# Patient Record
Sex: FEMALE | Race: WHITE | NOT HISPANIC OR LATINO | Employment: OTHER | URBAN - METROPOLITAN AREA
[De-identification: names, ages, dates, MRNs, and addresses within clinical notes are randomized per-mention and may not be internally consistent; named-entity substitution may affect disease eponyms.]

---

## 2018-09-21 PROBLEM — E78.5 HYPERLIPIDEMIA: Status: ACTIVE | Noted: 2018-09-21

## 2018-09-21 PROBLEM — R20.0 LEFT ARM NUMBNESS: Status: ACTIVE | Noted: 2018-09-21

## 2018-09-21 PROBLEM — G45.9 TRANSIENT CEREBRAL ISCHEMIA: Status: ACTIVE | Noted: 2018-09-21

## 2018-09-21 PROBLEM — I16.0 HYPERTENSIVE URGENCY: Status: ACTIVE | Noted: 2018-09-21

## 2018-09-21 PROBLEM — G44.89 OTHER HEADACHE SYNDROME: Status: ACTIVE | Noted: 2018-09-21

## 2018-11-20 PROBLEM — G44.89 OTHER HEADACHE SYNDROME: Status: RESOLVED | Noted: 2018-09-21 | Resolved: 2018-11-20

## 2018-11-20 PROBLEM — D75.839 THROMBOCYTOSIS: Status: ACTIVE | Noted: 2018-11-20

## 2018-11-20 PROBLEM — R20.0 LEFT ARM NUMBNESS: Status: RESOLVED | Noted: 2018-09-21 | Resolved: 2018-11-20

## 2018-11-20 PROBLEM — Z86.73 HISTORY OF TRANSIENT ISCHEMIC ATTACK (TIA): Status: ACTIVE | Noted: 2018-09-21

## 2018-11-20 PROBLEM — D75.1 POLYCYTHEMIA: Status: ACTIVE | Noted: 2018-11-20

## 2018-11-20 PROBLEM — Z86.39 HISTORY OF PARATHYROID DISEASE: Status: ACTIVE | Noted: 2018-11-20

## 2018-11-20 PROBLEM — I10 ESSENTIAL HYPERTENSION: Status: ACTIVE | Noted: 2018-09-21

## 2020-09-21 PROBLEM — C18.8 OVERLAPPING MALIGNANT NEOPLASM OF COLON: Status: ACTIVE | Noted: 2020-09-21

## 2020-09-21 PROBLEM — D47.1 MYELOPROLIFERATIVE DISORDER: Status: ACTIVE | Noted: 2020-09-21

## 2021-01-06 ENCOUNTER — IMMUNIZATION (OUTPATIENT)
Dept: FAMILY MEDICINE | Facility: CLINIC | Age: 76
End: 2021-01-06
Payer: MEDICARE

## 2021-01-06 DIAGNOSIS — Z23 NEED FOR VACCINATION: ICD-10-CM

## 2021-01-06 PROCEDURE — 91300 COVID-19, MRNA, LNP-S, PF, 30 MCG/0.3 ML DOSE VACCINE: CPT | Mod: PBBFAC,PO

## 2021-01-27 ENCOUNTER — IMMUNIZATION (OUTPATIENT)
Dept: FAMILY MEDICINE | Facility: CLINIC | Age: 76
End: 2021-01-27
Payer: MEDICARE

## 2021-01-27 DIAGNOSIS — Z23 NEED FOR VACCINATION: Primary | ICD-10-CM

## 2021-01-27 PROCEDURE — 91300 COVID-19, MRNA, LNP-S, PF, 30 MCG/0.3 ML DOSE VACCINE: CPT | Mod: PBBFAC,PO

## 2021-01-27 PROCEDURE — 0002A COVID-19, MRNA, LNP-S, PF, 30 MCG/0.3 ML DOSE VACCINE: CPT | Mod: PBBFAC,PO

## 2021-09-05 PROBLEM — K92.1 MELENA: Status: ACTIVE | Noted: 2021-09-05

## 2021-09-05 PROBLEM — D72.825 BANDEMIA: Status: ACTIVE | Noted: 2021-09-05

## 2021-09-05 PROBLEM — Z71.89 ACP (ADVANCE CARE PLANNING): Status: ACTIVE | Noted: 2021-09-05

## 2021-09-05 PROBLEM — I25.10 CORONARY ARTERY DISEASE INVOLVING NATIVE CORONARY ARTERY OF NATIVE HEART WITHOUT ANGINA PECTORIS: Status: ACTIVE | Noted: 2021-09-05

## 2021-09-06 PROBLEM — D62 ACUTE BLOOD LOSS ANEMIA: Status: ACTIVE | Noted: 2021-09-06

## 2021-09-06 PROBLEM — D64.9 SYMPTOMATIC ANEMIA: Status: ACTIVE | Noted: 2021-09-06

## 2021-09-08 PROBLEM — K22.6 MALLORY-WEISS TEAR: Status: ACTIVE | Noted: 2021-09-05

## 2021-09-09 ENCOUNTER — PATIENT MESSAGE (OUTPATIENT)
Dept: GASTROENTEROLOGY | Facility: CLINIC | Age: 76
End: 2021-09-09

## 2021-09-27 ENCOUNTER — IMMUNIZATION (OUTPATIENT)
Dept: FAMILY MEDICINE | Facility: CLINIC | Age: 76
End: 2021-09-27
Payer: MEDICARE

## 2021-09-27 DIAGNOSIS — Z23 NEED FOR VACCINATION: Primary | ICD-10-CM

## 2021-09-27 PROCEDURE — 0003A COVID-19, MRNA, LNP-S, PF, 30 MCG/0.3 ML DOSE VACCINE: CPT | Mod: PBBFAC,PO

## 2021-09-27 PROCEDURE — 91300 COVID-19, MRNA, LNP-S, PF, 30 MCG/0.3 ML DOSE VACCINE: CPT | Mod: PBBFAC,PO

## 2022-04-22 ENCOUNTER — IMMUNIZATION (OUTPATIENT)
Dept: FAMILY MEDICINE | Facility: CLINIC | Age: 77
End: 2022-04-22
Payer: MEDICARE

## 2022-04-22 DIAGNOSIS — Z23 NEED FOR VACCINATION: Primary | ICD-10-CM

## 2022-04-22 PROCEDURE — 91305 COVID-19, MRNA, LNP-S, PF, 30 MCG/0.3 ML DOSE VACCINE (PFIZER): CPT | Mod: PBBFAC,PO

## 2023-01-16 PROBLEM — I70.0 AORTIC ATHEROSCLEROSIS: Status: ACTIVE | Noted: 2023-01-16

## 2023-01-16 PROBLEM — I20.9 ANGINA PECTORIS, UNSPECIFIED: Status: ACTIVE | Noted: 2023-01-16

## 2023-06-08 ENCOUNTER — TELEPHONE (OUTPATIENT)
Dept: FAMILY MEDICINE | Facility: CLINIC | Age: 78
End: 2023-06-08
Payer: MEDICARE

## 2023-06-08 NOTE — TELEPHONE ENCOUNTER
Called patient regarding back and spine referral and patient said condition improved so she doesn't need appointment. AMADA

## 2023-07-17 PROBLEM — Z71.89 ACP (ADVANCE CARE PLANNING): Status: RESOLVED | Noted: 2021-09-05 | Resolved: 2023-07-17

## 2024-03-14 PROBLEM — Z95.5 S/P RIGHT CORONARY ARTERY (RCA) STENT PLACEMENT: Status: ACTIVE | Noted: 2024-03-14

## 2024-03-14 PROBLEM — R94.39 ABNORMAL STRESS TEST: Status: ACTIVE | Noted: 2024-03-14

## 2024-04-03 PROBLEM — I20.0 UNSTABLE ANGINA: Status: ACTIVE | Noted: 2023-01-16

## 2024-04-07 ENCOUNTER — OFFICE VISIT (OUTPATIENT)
Dept: URGENT CARE | Facility: CLINIC | Age: 79
End: 2024-04-07
Payer: MEDICARE

## 2024-04-07 VITALS
TEMPERATURE: 98 F | OXYGEN SATURATION: 98 % | RESPIRATION RATE: 17 BRPM | HEART RATE: 70 BPM | BODY MASS INDEX: 29.51 KG/M2 | DIASTOLIC BLOOD PRESSURE: 72 MMHG | SYSTOLIC BLOOD PRESSURE: 113 MMHG | WEIGHT: 172 LBS

## 2024-04-07 DIAGNOSIS — H57.11 EYE PAIN, RIGHT: Primary | ICD-10-CM

## 2024-04-07 PROCEDURE — 99213 OFFICE O/P EST LOW 20 MIN: CPT | Mod: S$GLB,,, | Performed by: NURSE PRACTITIONER

## 2024-04-07 RX ORDER — KETOROLAC TROMETHAMINE 5 MG/ML
1 SOLUTION OPHTHALMIC 2 TIMES DAILY
Qty: 15 ML | Refills: 0 | Status: SHIPPED | OUTPATIENT
Start: 2024-04-07 | End: 2024-04-17

## 2024-04-07 NOTE — PROGRESS NOTES
Subjective:      Patient ID: Jocelyn Chicas is a 78 y.o. female.    Vitals:  weight is 78 kg (172 lb). Her oral temperature is 98.1 °F (36.7 °C). Her blood pressure is 113/72 and her pulse is 70. Her respiration is 17 and oxygen saturation is 98%.     Chief Complaint: Eye Pain    This is a 78 y.o. female who presents today with a chief complaint of  rt eye pain. Pt states that she has an cont. wear contact.     Eye Pain   The right eye is affected. This is a recurrent problem. The current episode started in the past 7 days. The problem has been unchanged. The pain is at a severity of 8/10. The pain is mild. She Wears contacts. She has tried water for the symptoms. The treatment provided no relief.       Eyes:  Positive for eye pain.      Objective:     Physical Exam   Constitutional: She is oriented to person, place, and time. She appears well-developed.   HENT:   Head: Normocephalic and atraumatic.   Ears:   Right Ear: Hearing, tympanic membrane, external ear and ear canal normal.   Left Ear: Hearing, tympanic membrane, external ear and ear canal normal.   Nose: Nose normal.   Mouth/Throat: Uvula is midline and oropharynx is clear and moist.   Eyes: EOM and lids are normal. Pupils are equal, round, and reactive to light. Lids are everted and swept, no foreign bodies found. No visual field deficit is present. Right eye exhibits no discharge and no hordeolum. No foreign body present in the right eye. Left eye exhibits no discharge and no hordeolum. No foreign body present in the left eye. Right conjunctiva is injected. Right eye exhibits normal extraocular motion. Extraocular movement intact vision grossly intact gaze aligned appropriately periorbital hyperpigmentation  Neck: Trachea normal and phonation normal. Neck supple.   Musculoskeletal: Normal range of motion.         General: Normal range of motion.   Neurological: She is alert and oriented to person, place, and time.   Skin: Skin is warm, dry and intact.    Psychiatric: Her speech is normal and behavior is normal. Judgment and thought content normal.   Nursing note and vitals reviewed.      Assessment:     1. Eye pain, right        Plan:       Eye pain, right  -     ketorolac 0.5% (ACULAR) 0.5 % Drop; Place 1 drop into the right eye 2 (two) times daily. for 10 days  Dispense: 15 mL; Refill: 0      Patient Instructions   INSTRUCTIONS:  - Rest.  - Drink plenty of fluids.  - Take Tylenol and/or Ibuprofen as directed as needed for fever/pain.  Do not take more than the recommended dose.  - follow up with your PCP within the next 1-2 weeks as needed.  - You must understand that you have received an Urgent Care treatment only and that you may be released before all of your medical problems are known or treated.   - You, the patient, will arrange for follow up care as instructed.   - If your condition worsens or fails to improve we recommend that you receive another evaluation at the ER immediately or contact your PCP to discuss your concerns.   - You can call [phone] or [phone] to help schedule an appointment with the appropriate provider.     -If you smoke cigarettes, it would be beneficial for you to stop.      Monitor for new or worsening symptoms    OTC for symptom control    Recommend follow up with PCP/Pediatrician if symptoms are worsening     Patient advised to follow up with Ophthalmologist Monday morning

## 2024-04-07 NOTE — PATIENT INSTRUCTIONS

## 2024-04-10 PROBLEM — Z95.5 PRESENCE OF DRUG-ELUTING STENT IN LEFT CIRCUMFLEX CORONARY ARTERY: Status: ACTIVE | Noted: 2024-04-10

## 2024-04-10 PROBLEM — R06.09 DOE (DYSPNEA ON EXERTION): Status: ACTIVE | Noted: 2024-04-10

## 2024-08-29 PROBLEM — D72.825 BANDEMIA: Status: RESOLVED | Noted: 2021-09-05 | Resolved: 2024-08-29

## 2024-08-29 PROBLEM — R94.39 ABNORMAL STRESS TEST: Status: RESOLVED | Noted: 2024-03-14 | Resolved: 2024-08-29

## 2024-08-29 PROBLEM — D62 ACUTE BLOOD LOSS ANEMIA: Status: RESOLVED | Noted: 2021-09-06 | Resolved: 2024-08-29

## 2024-08-29 PROBLEM — N18.31 CHRONIC KIDNEY DISEASE, STAGE 3A: Status: ACTIVE | Noted: 2024-08-29

## 2024-08-29 PROBLEM — I20.0 UNSTABLE ANGINA: Status: RESOLVED | Noted: 2023-01-16 | Resolved: 2024-08-29

## 2024-08-29 PROBLEM — K22.6 MALLORY-WEISS TEAR: Status: RESOLVED | Noted: 2021-09-05 | Resolved: 2024-08-29

## 2024-08-29 PROBLEM — D64.9 SYMPTOMATIC ANEMIA: Status: RESOLVED | Noted: 2021-09-06 | Resolved: 2024-08-29

## 2024-11-15 ENCOUNTER — TELEPHONE (OUTPATIENT)
Dept: NEPHROLOGY | Facility: CLINIC | Age: 79
End: 2024-11-15
Payer: MEDICARE

## 2024-11-15 NOTE — TELEPHONE ENCOUNTER
----- Message from Sheila sent at 11/15/2024 12:17 PM CST -----  Type:  Sooner Appointment Request    Caller is requesting a sooner appointment.  Caller declined first available appointment listed below.  Caller will not accept being placed on the waitlist and is requesting a message be sent to doctor.    Name of Caller:  pt  When is the first available appointment?  None--said referral was sent over from her Oncology  Symptoms:  referral from Oncology  Would the patient rather a call back or a response via [facility]? Call or msg  Best Call Back Number:  [phone]     Additional Information:  thank you

## 2024-11-22 PROBLEM — Z86.39 HISTORY OF HYPERKALEMIA: Status: ACTIVE | Noted: 2024-11-22

## 2024-11-26 ENCOUNTER — OFFICE VISIT (OUTPATIENT)
Dept: NEPHROLOGY | Facility: CLINIC | Age: 79
End: 2024-11-26
Payer: MEDICARE

## 2024-11-26 VITALS — SYSTOLIC BLOOD PRESSURE: 144 MMHG | DIASTOLIC BLOOD PRESSURE: 74 MMHG

## 2024-11-26 DIAGNOSIS — Z86.39 HISTORY OF HYPERKALEMIA: ICD-10-CM

## 2024-11-26 DIAGNOSIS — I10 ESSENTIAL HYPERTENSION: ICD-10-CM

## 2024-11-26 DIAGNOSIS — D75.1 POLYCYTHEMIA: ICD-10-CM

## 2024-11-26 DIAGNOSIS — N18.32 STAGE 3B CHRONIC KIDNEY DISEASE: Primary | ICD-10-CM

## 2024-11-26 DIAGNOSIS — Z95.5 S/P RIGHT CORONARY ARTERY (RCA) STENT PLACEMENT: ICD-10-CM

## 2024-11-26 DIAGNOSIS — Z85.038 HISTORY OF MALIGNANT NEOPLASM OF COLON: ICD-10-CM

## 2024-11-26 DIAGNOSIS — E78.00 PURE HYPERCHOLESTEROLEMIA: ICD-10-CM

## 2024-11-26 DIAGNOSIS — Z86.39 HISTORY OF PARATHYROID DISEASE: ICD-10-CM

## 2024-11-26 DIAGNOSIS — I10 PRIMARY HYPERTENSION: ICD-10-CM

## 2024-11-26 PROCEDURE — 99999 PR PBB SHADOW E&M-EST. PATIENT-LVL II: CPT | Mod: PBBFAC,,, | Performed by: STUDENT IN AN ORGANIZED HEALTH CARE EDUCATION/TRAINING PROGRAM

## 2024-11-26 PROCEDURE — 99204 OFFICE O/P NEW MOD 45 MIN: CPT | Mod: S$PBB,,, | Performed by: STUDENT IN AN ORGANIZED HEALTH CARE EDUCATION/TRAINING PROGRAM

## 2024-11-26 PROCEDURE — 99212 OFFICE O/P EST SF 10 MIN: CPT | Mod: PBBFAC,PN | Performed by: STUDENT IN AN ORGANIZED HEALTH CARE EDUCATION/TRAINING PROGRAM

## 2024-11-26 RX ORDER — HYDROCHLOROTHIAZIDE 12.5 MG/1
12.5 TABLET ORAL DAILY
Qty: 30 TABLET | Refills: 11 | Status: SHIPPED | OUTPATIENT
Start: 2024-11-26 | End: 2025-11-26

## 2024-11-26 NOTE — PROGRESS NOTES
Subjective:       Patient ID: Jocelyn Chicas is a 79 y.o. female who presents for new patient evaluation for recurrent hyperkalemia    Jocelyn Chicas is referred by Tayler Ruiz MD to be evaluated for recurrent hyperkalemia in CKD evaluation.  She has ongoing medical conditions coronary artery disease status post stenting, hypertension, polycythemia, history of tobacco use disorder, hyperlipidemia, history of colon cancer.  We reviewed her recent lab trends at chair side.  Last chemistry panel was obtained on 11/20/2024 and featured a serum creatinine of 1.47 mg/dL, which is consistent with a slight up trend in her creatinine values since January of this year.  Most recent chemistry panel also featured a potassium of 4.6 millimoles per L which is within acceptable limits, however had repeated elevated trends during the month of October and early November.    In terms of her renal history, she denies hospitalizations for prior DINA or DINA requiring RRT. Denies known history of nephrolithiasis or hematuria episodes. No reported history of frequent or recurrent use of NSAIDs/COXI. No pertinent rheumatologic or AI disease history. Denies any pertinent family history of known kidney disease, or family members with ESRD requiring dialysis.  Other pertinent Uro/gyn history: denies  Smoking history: 50 pack years quit 2018  Fluid intake in 24hrs: 60 oz of fluid intake per day    She has no uremic symptoms. She does have urge incontinence.     During this visit, the patient and I reviewed her lab trends, discussed CKD epidemiology and risk factors, as well as general lifestyle and risk factor modifications to reduce her risk of progression to ESRD.         Review of Systems   Constitutional:  Negative for chills, diaphoresis and fever.   Respiratory:  Negative for cough and shortness of breath.    Cardiovascular:  Negative for chest pain and leg swelling.   Gastrointestinal:  Negative for abdominal pain, diarrhea, nausea  and vomiting.   Genitourinary:  Positive for frequency. Negative for difficulty urinating, dysuria and hematuria.   Musculoskeletal:  Negative for myalgias.   Neurological:  Negative for headaches.   Hematological:  Does not bruise/bleed easily.       The past medical, family and social histories were reviewed for this encounter.     Past Medical History:   Diagnosis Date    Anticoagulant long-term use     Colon cancer     Coronary artery disease     sent x 1    Elevated cholesterol     Encounter for blood transfusion     History of chicken pox     Hx of colonic polyps     Hypertension     Polycythemia     Smoker within last 12 months     pt states she quit in january of 2018     Past Surgical History:   Procedure Laterality Date    ANGIOGRAM, CORONARY, WITH LEFT HEART CATHETERIZATION  04/02/2024    Procedure: Left Heart Cath;  Surgeon: Fernie Garcia MD;  Location: [facility] CATH;  Service: Cardiovascular;;    BACK SURGERY  2008    BACK SURGERY  09/2021    dr barajas    COLONOSCOPY  06/29/2021    Dr. Clark    ESOPHAGOGASTRODUODENOSCOPY Left 09/06/2021    Procedure: EGD (ESOPHAGOGASTRODUODENOSCOPY);  Surgeon: Hawk Clark MD;  Location: [facility] ENDO;  Service: Endoscopy;  Laterality: Left;    ESOPHAGOGASTRODUODENOSCOPY N/A 09/05/2021    Procedure: EGD (ESOPHAGOGASTRODUODENOSCOPY);  Surgeon: Hawk Clark MD;  Location: [facility] ENDO;  Service: Endoscopy;  Laterality: N/A;    EYE SURGERY      catract in left eye    PARATHYROIDECTOMY  2014    PERCUTANEOUS CORONARY INTERVENTION, ARTERY  04/02/2024    Procedure: KM LCX;  Surgeon: Fernie Garcia MD;  Location: [facility] CATH;  Service: Cardiovascular;;    TOE AMPUTATION Right     big toe    TOE SURGERY Right 1984    right toe    TONSILLECTOMY       Social History     Socioeconomic History    Marital status:    Tobacco Use    Smoking status: Former     Current packs/day: 0.00     Average packs/day: 0.5 packs/day for 50.0 years (25.0 ttl  pk-yrs)     Types: Cigarettes     Start date: 10/10/1968     Quit date: 2018     Years since quittin.9    Smokeless tobacco: Never   Substance and Sexual Activity    Alcohol use: Not Currently     Comment: very rarely    Drug use: Never    Sexual activity: Not Currently     Partners: Male     Social Drivers of Health     Financial Resource Strain: Low Risk  (2024)    Overall Financial Resource Strain (CARDIA)     Difficulty of Paying Living Expenses: Not very hard   Food Insecurity: No Food Insecurity (2024)    Hunger Vital Sign     Worried About Running Out of Food in the Last Year: Never true     Ran Out of Food in the Last Year: Never true   Transportation Needs: No Transportation Needs (2023)    PRAPARE - Transportation     Lack of Transportation (Medical): No     Lack of Transportation (Non-Medical): No   Physical Activity: Unknown (2024)    Exercise Vital Sign     Days of Exercise per Week: 0 days   Stress: No Stress Concern Present (2024)    [employer] [place] [employer] - Occupational Stress Questionnaire     Feeling of Stress : Only a little   Housing Stability: Low Risk  (2023)    Housing Stability Vital Sign     Unable to Pay for Housing in the Last Year: No     Number of Places Lived in the Last Year: 1     Unstable Housing in the Last Year: No     Current Outpatient Medications   Medication Sig    amLODIPine (NORVASC) 10 MG tablet TAKE 1 TABLET BY MOUTH EVERY DAY IN THE EVENING    aspirin (ECOTRIN) 81 MG EC tablet Take 81 mg by mouth once daily.    atorvastatin (LIPITOR) 10 MG tablet TAKE 1 TABLET BY MOUTH EVERY DAY IN THE EVENING    calcium-vitamin D3-vitamin K 500-500-40 mg-unit-mcg Chew Take 1 Units by mouth Daily.    clopidogreL (PLAVIX) 75 mg tablet Take 1 tablet (75 mg total) by mouth once daily.    ergocalciferol (ERGOCALCIFEROL) 50,000 unit Cap Take 1 capsule (50,000 Units total) by mouth every 7 days.    hydroxyurea (HYDREA) 500 mg Cap Take 500  "mg by mouth once daily.    multivit-min-FA-lycopen-lutein (CENTRUM SILVER) 0.4-300-250 mg-mcg-mcg Tab Take 1 tablet by mouth once daily.    pantoprazole (PROTONIX) 40 MG tablet Take 40 mg by mouth.    hydroCHLOROthiazide (HYDRODIURIL) 12.5 MG Tab Take 1 tablet (12.5 mg total) by mouth once daily.     No current facility-administered medications for this visit.         Objective:   BP (!) 144/74 (BP Location: Right arm, Patient Position: Sitting)   LMP  (LMP Unknown)      Physical Exam  Vitals reviewed.   Constitutional:       General: She is not in acute distress.     Appearance: Normal appearance.   HENT:      Head: Normocephalic and atraumatic.   Eyes:      General: No scleral icterus.     Extraocular Movements: Extraocular movements intact.   Cardiovascular:      Rate and Rhythm: Normal rate and regular rhythm.      Pulses: Normal pulses.      Heart sounds: Normal heart sounds.      No friction rub.   Pulmonary:      Effort: Pulmonary effort is normal. No respiratory distress.      Breath sounds: Normal breath sounds.   Abdominal:      General: Abdomen is flat.      Palpations: Abdomen is soft.   Musculoskeletal:         General: No swelling.      Cervical back: Normal range of motion. No tenderness.      Right lower leg: No edema.      Left lower leg: No edema.   Neurological:      General: No focal deficit present.      Mental Status: She is oriented to person, place, and time.      Motor: No weakness.   Psychiatric:         Mood and Affect: Mood normal.         Behavior: Behavior normal.         Assessment:     Lab Results   Component Value Date    CREATININE 1.47 (H) 11/20/2024    BUN 31 (H) 11/20/2024     11/20/2024    K 4.6 11/20/2024     11/20/2024    CO2 26 11/20/2024     Lab Results   Component Value Date    PTH 59.8 09/09/2024    CALCIUM 9.4 11/20/2024     Lab Results   Component Value Date    HCT 41.2 11/20/2024     No results found for: "UTPCR"    No results found for: "MICALBCREAT"      "   1. Stage 3b chronic kidney disease    2. History of overlapping malignant neoplasm of colon    3. Primary hypertension    4. Essential hypertension    5. Pure hypercholesterolemia    6. Polycythemia    7. History of parathyroid disease - s/p partial parathyroidectomy    8. History of hyperkalemia    9. S/P right coronary artery (RCA) stent placement        Plan:   Return to clinic in 3 months  Labs for next visit include retroperitoneal ultrasound, urine sodium, urine potassium, UA CR, U PCR, UA, Mag, phos, CMP,  Baseline creatinine is 1.2-1.4 mg/dL    Chronic kidney disease stage IIIB   -risk from hypertension, hyperlipidemia, age  -not on RAAS inhibition due to issues with hyperkalemia previously   -follow up retroperitoneal ultrasound  -follow up urine albuminuria in urine studies to restage patient  -discuss risk factor modification including blood pressure control today    Hypertension-managed currently with max dose amlodipine, still not at goal.  Start low-dose hydrochlorothiazide today.  Should help with manipulation of sodium and potassium as long as she can maintain her hydration.  Discussed possibility of transitioning this to SGLT2 inhibitor at next visit pending her results.    Polycythemia-followed by Hematology  History of neoplasm of colon-followed by Hematology-Oncology    Hyperkalemia-I suspect multifactorial.  She has a difficult phlebotomy stick and the patient describes heavy tourniquet use.  We reviewed low-potassium diet today.  We will also use low-dose diuretic to hopefully augment renal potassium clearance    MBD evaluation-status post partial parathyroidectomy.  Follow up PTH/phos.  Continue vitamin-D supplementation for now.    Coronary artery disease-followed by Cardiology.    __________________________  Harsha Loredo MD  [facility] [place]    Part of this note has been created using [employer] dictation system. Errors in transcription may not be completely avoided.

## 2025-03-06 ENCOUNTER — OFFICE VISIT (OUTPATIENT)
Dept: NEPHROLOGY | Facility: CLINIC | Age: 80
End: 2025-03-06
Payer: MEDICARE

## 2025-03-06 VITALS — SYSTOLIC BLOOD PRESSURE: 128 MMHG | OXYGEN SATURATION: 98 % | HEART RATE: 59 BPM | DIASTOLIC BLOOD PRESSURE: 68 MMHG

## 2025-03-06 DIAGNOSIS — Z86.39 HISTORY OF PARATHYROID DISEASE: ICD-10-CM

## 2025-03-06 DIAGNOSIS — Z86.39 HISTORY OF HYPERKALEMIA: ICD-10-CM

## 2025-03-06 DIAGNOSIS — N18.32 STAGE 3B CHRONIC KIDNEY DISEASE: Primary | ICD-10-CM

## 2025-03-06 DIAGNOSIS — R80.9 ALBUMINURIA: ICD-10-CM

## 2025-03-06 DIAGNOSIS — Z95.5 S/P RIGHT CORONARY ARTERY (RCA) STENT PLACEMENT: ICD-10-CM

## 2025-03-06 DIAGNOSIS — I10 PRIMARY HYPERTENSION: ICD-10-CM

## 2025-03-06 PROCEDURE — 99212 OFFICE O/P EST SF 10 MIN: CPT | Mod: PBBFAC,PN | Performed by: STUDENT IN AN ORGANIZED HEALTH CARE EDUCATION/TRAINING PROGRAM

## 2025-03-06 PROCEDURE — 99999 PR PBB SHADOW E&M-EST. PATIENT-LVL II: CPT | Mod: PBBFAC,,, | Performed by: STUDENT IN AN ORGANIZED HEALTH CARE EDUCATION/TRAINING PROGRAM

## 2025-03-06 NOTE — PROGRESS NOTES
[facility]  Nephrology Clinic    Subjective:       HPI ID: Jocelyn Chicas is a 79 y.o. female who returns for ongoing evaluation and management of CKD.   She has ongoing medical conditions coronary artery disease status post stenting, hypertension, polycythemia, history of tobacco use disorder, hyperlipidemia, history of colon cancer.  We reviewed her recent lab trends at chair side.  Last chemistry panel was obtained on 11/20/2024 and featured a serum creatinine of 1.47 mg/dL, which is consistent with a slight up trend in her creatinine values since January of this year.  Most recent chemistry panel also featured a potassium of 4.6 millimoles per L which is within acceptable limits, however had repeated elevated trends during the month of October and early November.    In terms of her renal history, she denies hospitalizations for prior DINA or DINA requiring RRT. Denies known history of nephrolithiasis or hematuria episodes. No reported history of frequent or recurrent use of NSAIDs/COXI. No pertinent rheumatologic or AI disease history. Denies any pertinent family history of known kidney disease, or family members with ESRD requiring dialysis.  Other pertinent Uro/gyn history: denies  Smoking history: 50 pack years quit 2018  Fluid intake in 24hrs: 60 oz of fluid intake per day    Interval history:  03/06/25 - here for ckd f/u evaluation today. Feels well but reports she has not been hydrating as well as she should. Appetite is good. Denies acute complaints. We reviewed recent labs at chairside.  Renal function based on serum creatinine trend remains at baseline.           Review of Systems   Constitutional:  Negative for chills, diaphoresis and fever.   Respiratory:  Negative for cough and shortness of breath.    Cardiovascular:  Negative for chest pain and leg swelling.   Gastrointestinal:  Negative for abdominal pain, diarrhea, nausea and vomiting.   Genitourinary:  Positive for frequency.  Negative for difficulty urinating, dysuria and hematuria.   Musculoskeletal:  Negative for myalgias.   Neurological:  Negative for headaches.   Hematological:  Does not bruise/bleed easily.       The past medical, family and social histories were reviewed for this encounter.     Past Medical History:   Diagnosis Date    Anticoagulant long-term use     Colon cancer     Coronary artery disease     sent x 1    Elevated cholesterol     Encounter for blood transfusion     History of chicken pox     Hx of colonic polyps     Hypertension     Polycythemia     Smoker within last 12 months     pt states she quit in january of 2018       Current Outpatient Medications   Medication Sig    amLODIPine (NORVASC) 10 MG tablet TAKE 1 TABLET BY MOUTH EVERY DAY IN THE EVENING    aspirin (ECOTRIN) 81 MG EC tablet Take 81 mg by mouth once daily.    atorvastatin (LIPITOR) 10 MG tablet TAKE 1 TABLET BY MOUTH EVERY DAY IN THE EVENING    clopidogreL (PLAVIX) 75 mg tablet TAKE 1 TABLET BY MOUTH EVERY DAY    ergocalciferol (ERGOCALCIFEROL) 50,000 unit Cap Take 1 capsule (50,000 Units total) by mouth every 7 days.    hydroCHLOROthiazide (HYDRODIURIL) 12.5 MG Tab Take 1 tablet (12.5 mg total) by mouth once daily.    hydroxyurea (HYDREA) 500 mg Cap Take 500 mg by mouth once daily. (Patient taking differently: Take 500 mg by mouth once daily Extra 2 tabs.. one Monday and one Thursday .)    pantoprazole (PROTONIX) 40 MG tablet Take 40 mg by mouth.    calcium-vitamin D3-vitamin K 500-500-40 mg-unit-mcg Chew Take 1 Units by mouth Daily. (Patient not taking: Reported on 3/6/2025)    empagliflozin (JARDIANCE) 10 mg tablet Take 1 tablet (10 mg total) by mouth once daily.    multivit-min-FA-lycopen-lutein (CENTRUM SILVER) 0.4-300-250 mg-mcg-mcg Tab Take 1 tablet by mouth once daily. (Patient not taking: Reported on 3/6/2025)     No current facility-administered medications for this visit.         Objective:   /68 (BP Location: Left arm, Patient  Position: Sitting)   Pulse (!) 59   LMP  (LMP Unknown)   SpO2 98%      Physical Exam  Vitals reviewed.   Constitutional:       General: She is not in acute distress.     Appearance: Normal appearance.   Cardiovascular:      Pulses: Normal pulses.      Heart sounds: Normal heart sounds.      No friction rub.   Pulmonary:      Effort: Pulmonary effort is normal. No respiratory distress.      Breath sounds: Normal breath sounds.   Abdominal:      General: Abdomen is flat.      Palpations: Abdomen is soft.   Musculoskeletal:         General: No swelling.      Right lower leg: No edema.      Left lower leg: No edema.   Neurological:      General: No focal deficit present.      Mental Status: She is oriented to person, place, and time.      Motor: No weakness.   Psychiatric:         Mood and Affect: Mood normal.         Behavior: Behavior normal.         Assessment:     Lab Results   Component Value Date    CREATININE 1.23 02/22/2025    CREATININE 1.23 02/22/2025    BUN 23 02/22/2025    BUN 23 02/22/2025     02/22/2025     02/22/2025    K 4.8 02/22/2025    K 4.8 02/22/2025     02/22/2025     02/22/2025    CO2 26 02/22/2025    CO2 25 02/22/2025     Lab Results   Component Value Date    PTH 95.5 (H) 02/22/2025    CALCIUM 9.8 02/22/2025    CALCIUM 9.7 02/22/2025    PHOS 3.0 02/22/2025     Lab Results   Component Value Date    HCT 41.8 02/22/2025     Prot/Creat Ratio, Urine   Date Value Ref Range Status   02/24/2025 0.7 (H) 0.0 - 0.2 Final       Lab Results   Component Value Date    MICALBCREAT 439.0 (H) 02/24/2025           1. Stage 3b chronic kidney disease    2. Albuminuria    3. Primary hypertension    4. History of parathyroid disease    5. History of hyperkalemia    6. S/P right coronary artery (RCA) stent placement          Plan:   Return to clinic in 3 months  Labs for next visit include rfp, pth, uacr  Baseline creatinine is 1.2-1.4 mg/dL    Chronic kidney disease stage G3b / A3   -risk  from hypertension, hyperlipidemia, age  -not on RAAS inhibition due to issues with hyperkalemia previously   -start SGLT2-I  -discuss risk factor modification including blood pressure control today  -estimated low risk of renal progression based on KFRE model    Hypertension- better controlled today; continue HCTZ    Polycythemia-followed by Hematology  History of neoplasm of colon-followed by Hematology-Oncology    S/p parathyroidectomy -status post partial parathyroidectomy. PTH reviwed;  Continue vitamin-D supplementation for now.    Coronary artery disease-followed by Cardiology.    Nephrolithiasis - noted on imaging. asymptomatic stone, 1st occurrence. Discussed diet modification.     Abnormal UA - reports no symptoms. No treatment indicated for asymptomatic bacteruria.   __________________________  Harsha Loredo MD  [facility]    Part of this note has been created using [email or web address] system. Errors in transcription may not be completely avoided.    KFRE 2-Year: 1.1% at 2/24/2025  2:08 PM  Calculated from:  Serum Creatinine: 1.23 mg/dL at 2/22/2025  8:59 AM  Urine Albumin Creatinine Ratio: 439.0 ug/mg at 2/24/2025  2:08 PM  Age: 79 years  Sex: Female at 2/24/2025  2:08 PM  Has CKD-3 to CKD-5: Yes    KFRE 5-Year: 3.3% at 2/24/2025  2:08 PM  Calculated from:  Serum Creatinine: 1.23 mg/dL at 2/22/2025  8:59 AM  Urine Albumin Creatinine Ratio: 439.0 ug/mg at 2/24/2025  2:08 PM  Age: 79 years  Sex: Female at 2/24/2025  2:08 PM  Has CKD-3 to CKD-5: Yes

## 2025-03-07 ENCOUNTER — PATIENT MESSAGE (OUTPATIENT)
Dept: NEPHROLOGY | Facility: CLINIC | Age: 80
End: 2025-03-07
Payer: MEDICARE

## 2025-03-07 NOTE — TELEPHONE ENCOUNTER
"Received MyChart message from patient.    PER MD, "Let's hold off until next visit and we can discuss other options."    Relayed via [employer] message.  "

## 2025-06-17 ENCOUNTER — TELEPHONE (OUTPATIENT)
Dept: NEPHROLOGY | Facility: CLINIC | Age: 80
End: 2025-06-17
Payer: MEDICARE

## 2025-06-19 ENCOUNTER — OFFICE VISIT (OUTPATIENT)
Dept: NEPHROLOGY | Facility: CLINIC | Age: 80
End: 2025-06-19
Payer: MEDICARE

## 2025-06-19 VITALS
HEART RATE: 66 BPM | DIASTOLIC BLOOD PRESSURE: 70 MMHG | OXYGEN SATURATION: 98 % | SYSTOLIC BLOOD PRESSURE: 128 MMHG | WEIGHT: 171.06 LBS | BODY MASS INDEX: 29.2 KG/M2 | HEIGHT: 64 IN

## 2025-06-19 DIAGNOSIS — N18.32 STAGE 3B CHRONIC KIDNEY DISEASE: ICD-10-CM

## 2025-06-19 DIAGNOSIS — D75.1 POLYCYTHEMIA: ICD-10-CM

## 2025-06-19 DIAGNOSIS — E55.9 VITAMIN D DEFICIENCY: ICD-10-CM

## 2025-06-19 DIAGNOSIS — N39.0 UTI (URINARY TRACT INFECTION), UNCOMPLICATED: Primary | ICD-10-CM

## 2025-06-19 DIAGNOSIS — Z86.39 HISTORY OF HYPERKALEMIA: ICD-10-CM

## 2025-06-19 DIAGNOSIS — Z86.39 HISTORY OF PARATHYROID DISEASE: ICD-10-CM

## 2025-06-19 DIAGNOSIS — I10 PRIMARY HYPERTENSION: ICD-10-CM

## 2025-06-19 DIAGNOSIS — R80.9 ALBUMINURIA: ICD-10-CM

## 2025-06-19 PROCEDURE — 99213 OFFICE O/P EST LOW 20 MIN: CPT | Mod: PBBFAC,PN | Performed by: STUDENT IN AN ORGANIZED HEALTH CARE EDUCATION/TRAINING PROGRAM

## 2025-06-19 PROCEDURE — 99999 PR PBB SHADOW E&M-EST. PATIENT-LVL III: CPT | Mod: PBBFAC,,, | Performed by: STUDENT IN AN ORGANIZED HEALTH CARE EDUCATION/TRAINING PROGRAM

## 2025-06-19 RX ORDER — CEPHALEXIN 500 MG/1
500 CAPSULE ORAL EVERY 12 HOURS
Qty: 10 CAPSULE | Refills: 0 | Status: SHIPPED | OUTPATIENT
Start: 2025-06-19 | End: 2025-06-24

## 2025-06-19 RX ORDER — ERGOCALCIFEROL 1.25 MG/1
CAPSULE ORAL
Qty: 21 CAPSULE | Refills: 0 | Status: SHIPPED | OUTPATIENT
Start: 2025-06-19 | End: 2026-06-14

## 2025-06-19 NOTE — PROGRESS NOTES
[facility]  Nephrology Clinic    Subjective:       HPI ID: [id number] Chicas is a 80 y.o. female who returns for ongoing evaluation and management of CKD.   She has ongoing medical conditions coronary artery disease status post stenting, hypertension, polycythemia, history of tobacco use disorder, hyperlipidemia, history of colon cancer.  We reviewed her recent lab trends at chair side.  Last chemistry panel was obtained on 11/20/2024 and featured a serum creatinine of 1.47 mg/dL, which is consistent with a slight up trend in her creatinine values since January of this year.  Most recent chemistry panel also featured a potassium of 4.6 millimoles per L which is within acceptable limits, however had repeated elevated trends during the month of October and early November.    In terms of her renal history, she denies hospitalizations for prior DINA or DINA requiring RRT. Denies known history of nephrolithiasis or hematuria episodes. No reported history of frequent or recurrent use of NSAIDs/COXI. No pertinent rheumatologic or AI disease history. Denies any pertinent family history of known kidney disease, or family members with ESRD requiring dialysis.  Other pertinent Uro/gyn history: denies  Smoking history: 50 pack years quit 2018  Fluid intake in 24hrs: 60 oz of fluid intake per day    Interval history:  03/06/25 - here for ckd f/u evaluation today. Feels well but reports she has not been hydrating as well as she should. Appetite is good. Denies acute complaints. We reviewed recent labs at chairside.  Renal function based on serum creatinine trend remains at baseline.     6/19/25 - here for f/u. Feels well. Reports her energy is doing great. Appetite is good. She is on weight watchers and lost 15 lbs. Avoiding dehydration. We reviewed recent labs at chairside.  Renal function based on serum creatinine trend remains at baseline.  She was unable to fill SGLT2-I after last visit.   We reviewed her UA  results ft [facility]. She has a history of stones and mild renal insufficiency, will elect to treat.       Review of Systems   Constitutional:  Negative for chills, diaphoresis and fever.   Respiratory:  Negative for cough and shortness of breath.    Cardiovascular:  Negative for chest pain and leg swelling.   Gastrointestinal:  Negative for abdominal pain, diarrhea, nausea and vomiting.   Genitourinary:  Negative for difficulty urinating, dysuria, frequency and hematuria.   Musculoskeletal:  Negative for myalgias.   Neurological:  Negative for headaches.   Hematological:  Does not bruise/bleed easily.       The past medical, family and social histories were reviewed for this encounter.     Past Medical History:   Diagnosis Date    Anticoagulant long-term use     Colon cancer     Coronary artery disease     sent x 1    Elevated cholesterol     Encounter for blood transfusion     History of chicken pox     Hx of colonic polyps     Hypertension     Polycythemia     Smoker within last 12 months     pt states she quit in january of 2018       Current Outpatient Medications   Medication Sig    amLODIPine (NORVASC) 10 MG tablet TAKE 1 TABLET BY MOUTH EVERY DAY IN THE EVENING    aspirin (ECOTRIN) 81 MG EC tablet Take 81 mg by mouth once daily.    atorvastatin (LIPITOR) 10 MG tablet TAKE 1 TABLET BY MOUTH EVERY DAY IN THE EVENING    clopidogreL (PLAVIX) 75 mg tablet TAKE 1 TABLET BY MOUTH EVERY DAY    hydroCHLOROthiazide (HYDRODIURIL) 12.5 MG Tab Take 1 tablet (12.5 mg total) by mouth once daily.    hydroxyurea (HYDREA) 500 mg Cap Take 500 mg by mouth once daily. (Patient taking differently: Take 500 mg by mouth once daily Extra 2 tabs.. one Monday and one Thursday .)    multivit-min-FA-lycopen-lutein (CENTRUM SILVER) 0.4-300-250 mg-mcg-mcg Tab Take 1 tablet by mouth once daily.    pantoprazole (PROTONIX) 40 MG tablet Take 40 mg by mouth.    cephALEXin (KEFLEX) 500 MG capsule Take 1 capsule (500 mg total) by mouth every 12  "(twelve) hours. for 5 days    empagliflozin (JARDIANCE) 10 mg tablet Take 1 tablet (10 mg total) by mouth once daily. (Patient not taking: Reported on 6/19/2025)    ergocalciferol (ERGOCALCIFEROL) 50,000 unit Cap Take 1 capsule (50,000 Units total) by mouth every 7 days for 90 days, THEN 1 capsule (50,000 Units total) every 30 days.     No current facility-administered medications for this visit.       Objective:   /70 (BP Location: Right arm, Patient Position: Sitting)   Pulse 66   Ht 5' 4" (1.626 m)   Wt 77.6 kg (171 lb 1.2 oz)   LMP  (LMP Unknown)   SpO2 98%   BMI 29.37 kg/m²      Physical Exam  Vitals reviewed.   Constitutional:       General: She is not in acute distress.     Appearance: Normal appearance.   Cardiovascular:      Pulses: Normal pulses.      Heart sounds: Normal heart sounds.      No friction rub.   Pulmonary:      Effort: Pulmonary effort is normal. No respiratory distress.      Breath sounds: Normal breath sounds.   Abdominal:      General: Abdomen is flat.      Palpations: Abdomen is soft.   Musculoskeletal:         General: No swelling.      Right lower leg: No edema.      Left lower leg: No edema.   Neurological:      Mental Status: She is oriented to person, place, and time.      Motor: No weakness.   Psychiatric:         Mood and Affect: Mood normal.         Behavior: Behavior normal.         Assessment:     Lab Results   Component Value Date    CREATININE 1.52 (H) 06/16/2025    BUN 37 (H) 06/16/2025     06/16/2025    K 5.1 06/16/2025     06/16/2025    CO2 28 06/16/2025     Lab Results   Component Value Date    .6 (H) 06/16/2025    CALCIUM 9.7 06/16/2025    PHOS 3.0 06/16/2025     Lab Results   Component Value Date    HCT 40.2 05/19/2025     Prot/Creat Ratio, Urine   Date Value Ref Range Status   02/24/2025 0.7 (H) 0.0 - 0.2 Final       Lab Results   Component Value Date    MICALBCREAT 205.6 (H) 06/16/2025    MICALBCREAT 439.0 (H) 02/24/2025           1. UTI " (urinary tract infection), uncomplicated    2. Vitamin D deficiency    3. Stage 3b chronic kidney disease    4. Albuminuria    5. Primary hypertension    6. History of parathyroid disease    7. Polycythemia        Plan:   Return to clinic in 3 months  Baseline creatinine is 1.2-1.4 mg/dL  Orders Placed This Encounter   Procedures    Vitamin D    PTH, Intact    Renal Function Panel    Microalbumin/Creatinine Ratio, Urine       CKD G3b / A3   -risk from hypertension, hyperlipidemia, age  -not on RAAS inhibition due to issues with hyperkalemia previously   -SGLT2-I unobtainable d/t cost  -discuss risk factor modification including blood pressure control today  -estimated low risk of renal progression based on KFRE model    Hypertension- better controlled today; continue HCTZ. Increase to 25mg.     Polycythemia-followed by Hematology  History of neoplasm of colon-followed by Hematology-Oncology    S/p parathyroidectomy -status post partial parathyroidectomy. PTH reviwed; uptrending. Continue vitamin-D supplementation for now. If remains elevated at f/u will need NMS. Check Vit D    Coronary artery disease-followed by Cardiology.    Nephrolithiasis - noted on imaging. asymptomatic stone, 1st occurrence. Discussed diet modification.     Abnormal UA - reports no symptoms. No treatment indicated for asymptomatic bacteruria.   __________________________  Harsha Loredo MD  [facility] [place]    Part of this note has been created using Canva dictation system. Errors in transcription may not be completely avoided.    KFRE 2-Year: 2.3% at 6/16/2025 12:41 PM  Calculated from:  Serum Creatinine: 1.52 mg/dL at 6/16/2025 12:38 PM  Urine Albumin Creatinine Ratio: 205.6 ug/mg at 6/16/2025 12:41 PM  Age: 80 years  Sex: Female at 6/16/2025 12:41 PM  Has CKD-3 to CKD-5: Yes    KFRE 5-Year: 6.9% at 6/16/2025 12:41 PM  Calculated from:  Serum Creatinine: 1.52 mg/dL at 6/16/2025 12:38 PM  Urine Albumin Creatinine Ratio:  205.6 ug/mg at 6/16/2025 12:41 PM  Age: 80 years  Sex: Female at 6/16/2025 12:41 PM  Has CKD-3 to CKD-5: Yes

## 2025-07-15 ENCOUNTER — TELEPHONE (OUTPATIENT)
Dept: NEPHROLOGY | Facility: CLINIC | Age: 80
End: 2025-07-15
Payer: MEDICARE